# Patient Record
Sex: MALE | Race: WHITE | NOT HISPANIC OR LATINO | URBAN - METROPOLITAN AREA
[De-identification: names, ages, dates, MRNs, and addresses within clinical notes are randomized per-mention and may not be internally consistent; named-entity substitution may affect disease eponyms.]

---

## 2021-06-14 ENCOUNTER — EMERGENCY (EMERGENCY)
Facility: HOSPITAL | Age: 26
LOS: 1 days | Discharge: HOME | End: 2021-06-14
Attending: EMERGENCY MEDICINE
Payer: COMMERCIAL

## 2021-06-14 ENCOUNTER — TRANSCRIPTION ENCOUNTER (OUTPATIENT)
Age: 26
End: 2021-06-14

## 2021-06-14 VITALS — SYSTOLIC BLOOD PRESSURE: 183 MMHG | DIASTOLIC BLOOD PRESSURE: 89 MMHG | HEART RATE: 72 BPM

## 2021-06-14 VITALS
SYSTOLIC BLOOD PRESSURE: 149 MMHG | RESPIRATION RATE: 18 BRPM | WEIGHT: 220.02 LBS | DIASTOLIC BLOOD PRESSURE: 98 MMHG | HEIGHT: 73 IN | TEMPERATURE: 98 F | HEART RATE: 73 BPM | OXYGEN SATURATION: 99 %

## 2021-06-14 LAB
ANION GAP SERPL CALC-SCNC: 2 MMOL/L — LOW (ref 5–17)
APPEARANCE UR: CLEAR — SIGNIFICANT CHANGE UP
BACTERIA # UR AUTO: ABNORMAL /HPF
BASOPHILS # BLD AUTO: 0.04 K/UL — SIGNIFICANT CHANGE UP (ref 0–0.2)
BASOPHILS NFR BLD AUTO: 0.7 % — SIGNIFICANT CHANGE UP (ref 0–2)
BILIRUB UR-MCNC: NEGATIVE — SIGNIFICANT CHANGE UP
BUN SERPL-MCNC: 16 MG/DL — SIGNIFICANT CHANGE UP (ref 7–18)
CALCIUM SERPL-MCNC: 9.5 MG/DL — SIGNIFICANT CHANGE UP (ref 8.4–10.5)
CHLORIDE SERPL-SCNC: 108 MMOL/L — SIGNIFICANT CHANGE UP (ref 96–108)
CO2 SERPL-SCNC: 30 MMOL/L — SIGNIFICANT CHANGE UP (ref 22–31)
COLOR SPEC: YELLOW — SIGNIFICANT CHANGE UP
CREAT SERPL-MCNC: 1.33 MG/DL — HIGH (ref 0.5–1.3)
DIFF PNL FLD: NEGATIVE — SIGNIFICANT CHANGE UP
EOSINOPHIL # BLD AUTO: 0.32 K/UL — SIGNIFICANT CHANGE UP (ref 0–0.5)
EOSINOPHIL NFR BLD AUTO: 5.9 % — SIGNIFICANT CHANGE UP (ref 0–6)
EPI CELLS # UR: ABNORMAL /HPF
GLUCOSE SERPL-MCNC: 95 MG/DL — SIGNIFICANT CHANGE UP (ref 70–99)
GLUCOSE UR QL: NEGATIVE — SIGNIFICANT CHANGE UP
HCT VFR BLD CALC: 48.7 % — SIGNIFICANT CHANGE UP (ref 39–50)
HGB BLD-MCNC: 16.6 G/DL — SIGNIFICANT CHANGE UP (ref 13–17)
IMM GRANULOCYTES NFR BLD AUTO: 0.4 % — SIGNIFICANT CHANGE UP (ref 0–1.5)
KETONES UR-MCNC: NEGATIVE — SIGNIFICANT CHANGE UP
LEUKOCYTE ESTERASE UR-ACNC: NEGATIVE — SIGNIFICANT CHANGE UP
LYMPHOCYTES # BLD AUTO: 1.67 K/UL — SIGNIFICANT CHANGE UP (ref 1–3.3)
LYMPHOCYTES # BLD AUTO: 30.7 % — SIGNIFICANT CHANGE UP (ref 13–44)
MCHC RBC-ENTMCNC: 28.3 PG — SIGNIFICANT CHANGE UP (ref 27–34)
MCHC RBC-ENTMCNC: 34.1 GM/DL — SIGNIFICANT CHANGE UP (ref 32–36)
MCV RBC AUTO: 83 FL — SIGNIFICANT CHANGE UP (ref 80–100)
MONOCYTES # BLD AUTO: 0.45 K/UL — SIGNIFICANT CHANGE UP (ref 0–0.9)
MONOCYTES NFR BLD AUTO: 8.3 % — SIGNIFICANT CHANGE UP (ref 2–14)
NEUTROPHILS # BLD AUTO: 2.94 K/UL — SIGNIFICANT CHANGE UP (ref 1.8–7.4)
NEUTROPHILS NFR BLD AUTO: 54 % — SIGNIFICANT CHANGE UP (ref 43–77)
NITRITE UR-MCNC: NEGATIVE — SIGNIFICANT CHANGE UP
NRBC # BLD: 0 /100 WBCS — SIGNIFICANT CHANGE UP (ref 0–0)
PH UR: 5 — SIGNIFICANT CHANGE UP (ref 5–8)
PLATELET # BLD AUTO: 180 K/UL — SIGNIFICANT CHANGE UP (ref 150–400)
POTASSIUM SERPL-MCNC: 4.6 MMOL/L — SIGNIFICANT CHANGE UP (ref 3.5–5.3)
POTASSIUM SERPL-SCNC: 4.6 MMOL/L — SIGNIFICANT CHANGE UP (ref 3.5–5.3)
PROT UR-MCNC: 100
RBC # BLD: 5.87 M/UL — HIGH (ref 4.2–5.8)
RBC # FLD: 12.3 % — SIGNIFICANT CHANGE UP (ref 10.3–14.5)
SODIUM SERPL-SCNC: 140 MMOL/L — SIGNIFICANT CHANGE UP (ref 135–145)
SP GR SPEC: 1.01 — SIGNIFICANT CHANGE UP (ref 1.01–1.02)
URATE CRY FLD QL MICRO: ABNORMAL /HPF
UROBILINOGEN FLD QL: NEGATIVE — SIGNIFICANT CHANGE UP
WBC # BLD: 5.44 K/UL — SIGNIFICANT CHANGE UP (ref 3.8–10.5)
WBC # FLD AUTO: 5.44 K/UL — SIGNIFICANT CHANGE UP (ref 3.8–10.5)
WBC UR QL: SIGNIFICANT CHANGE UP /HPF (ref 0–5)

## 2021-06-14 PROCEDURE — 99284 EMERGENCY DEPT VISIT MOD MDM: CPT

## 2021-06-14 PROCEDURE — 36415 COLL VENOUS BLD VENIPUNCTURE: CPT

## 2021-06-14 PROCEDURE — 99284 EMERGENCY DEPT VISIT MOD MDM: CPT | Mod: 25

## 2021-06-14 PROCEDURE — 87591 N.GONORRHOEAE DNA AMP PROB: CPT

## 2021-06-14 PROCEDURE — 76870 US EXAM SCROTUM: CPT

## 2021-06-14 PROCEDURE — 80048 BASIC METABOLIC PNL TOTAL CA: CPT

## 2021-06-14 PROCEDURE — 76870 US EXAM SCROTUM: CPT | Mod: 26

## 2021-06-14 PROCEDURE — 85025 COMPLETE CBC W/AUTO DIFF WBC: CPT

## 2021-06-14 PROCEDURE — 87491 CHLMYD TRACH DNA AMP PROBE: CPT

## 2021-06-14 PROCEDURE — 81001 URINALYSIS AUTO W/SCOPE: CPT

## 2021-06-14 PROCEDURE — 96374 THER/PROPH/DIAG INJ IV PUSH: CPT

## 2021-06-14 RX ORDER — KETOROLAC TROMETHAMINE 30 MG/ML
15 SYRINGE (ML) INJECTION ONCE
Refills: 0 | Status: DISCONTINUED | OUTPATIENT
Start: 2021-06-14 | End: 2021-06-14

## 2021-06-14 RX ADMIN — Medication 15 MILLIGRAM(S): at 14:25

## 2021-06-14 NOTE — ED PROVIDER NOTE - PATIENT PORTAL LINK FT
You can access the FollowMyHealth Patient Portal offered by [facility] by registering at the following website: [email or web address]. By joining [facility]’s FollowMyHealth portal, you will also be able to view your health information using other applications (apps) compatible with our system.

## 2021-06-14 NOTE — CONSULT NOTE ADULT - ASSESSMENT
26 y/o Male w/ Torsed epididymal appendage left testicle    -Testicular US reviewed, no evidence of testicular torsion, pt w/ Torsed epididymal appendage  -Recommend rest, scrotal elevation, pain medication/ Naproxen 500mg BID PRN   -Outpatient follow up with Dr Alexander vs pt's outpatient Urologist   -D/w Dr Alexander and agrees

## 2021-06-14 NOTE — ED PROVIDER NOTE - CARE PROVIDER_API CALL
Burke Alexander  UROLOGY  [address] [place] [address]  Phone: [phone]  Fax: [phone]  Follow Up Time: Urgent

## 2021-06-14 NOTE — CONSULT NOTE ADULT - SUBJECTIVE AND OBJECTIVE BOX
Attending:  Dr Alexander     HPI:  24 y/o Male w/ PSHx of bilateral urethral implants, oversized bladder presented to ED c/o     PAST MEDICAL & SURGICAL HISTORY:      MEDICATIONS  (STANDING):    MEDICATIONS  (PRN):      Allergies    No Known Allergies    Intolerances        SOCIAL HISTORY:    FAMILY HISTORY:      Vital Signs Last 24 Hrs  T(C): 36.7 (2021 15:55), Max: 36.7 (2021 15:55)  T(F): 98.1 (2021 15:55), Max: 98.1 (2021 15:55)  HR: 63 (2021 15:55) (63 - 73)  BP: 142/90 (2021 15:55) (142/90 - 149/98)  BP(mean): --  RR: 18 (2021 15:55) (18 - 18)  SpO2: 99% (2021 15:55) (99% - 99%)    I&O's Summary      LABS:                        16.6   5.44  )-----------( 180      ( 2021 14:49 )             48.7     06-14    140  |  108  |  16  ----------------------------<  95  4.6   |  30  |  1.33<H>    Ca    9.5      2021 14:49        Urinalysis Basic - ( 2021 14:52 )    Color: Yellow / Appearance: Clear / S.015 / pH: x  Gluc: x / Ketone: Negative  / Bili: Negative / Urobili: Negative   Blood: x / Protein: 100 / Nitrite: Negative   Leuk Esterase: Negative / RBC: x / WBC 0-2 /HPF   Sq Epi: x / Non Sq Epi: Occasional /HPF / Bacteria: Trace /HPF        RADIOLOGY & ADDITIONAL STUDIES:  < from: US Testicles (21 @ 14:58) >  FINDINGS:    RIGHT:  Right testis: 4.3 cm x 4.9 cm x  2.2 cm. Normal echogenicity and echotexture with no masses or areas of architectural distortion. Normal arterial and venous blood flow pattern.  Right epididymis: 1.0 x 0.8 x 0.8 cm avascular hypoechoic focus within the right epididymis of uncertain etiology, however may represent a torsed epididymal appendage.  Right hydrocele: None.  Right varicocele: None.    LEFT:  Left testis: 3.9 cm x 4.6 cm x 2.3 cm. Normal echogenicity and echotexture with no masses or areas of architectural distortion. Normal arterial and venous blood flow pattern.  Left epididymis: Within normal limits.  Left hydrocele: None.  Left varicocele: None.    IMPRESSION:    No sonographic evidence of testicular torsion.    1.0 cm avascular hypoechoic focus within the right epididymis of uncertain etiology. Torsed epididymal appendage is a differential diagnostic consideration.    < end of copied text >   Attending:  Dr Alexander     HPI:  26 y/o Male w/ PSHx of bilateral urethral implants, oversized bladder presented to ED c/o left testicular pain, pain present since Wednesday, progressively worse, admits to increased testicular swelling, more prominent after playing basketball. Admits to hx of UTI, currently no burning on urination, no urethral discharge, no hx of STDs; admits to inability to fully empty urinary bladder and dribbling, has outpatient Urologist, last seen 2 yrs ago, work up done and as per pt all wnl.      PAST MEDICAL & SURGICAL HISTORY:  As above         Allergies  No Known Allergies  Intolerances        SOCIAL HISTORY:  Unknown   FAMILY HISTORY:  Unknown     Vital Signs Last 24 Hrs  T(C): 36.7 (2021 15:55), Max: 36.7 (2021 15:55)  T(F): 98.1 (2021 15:55), Max: 98.1 (2021 15:55)  HR: 63 (2021 15:55) (63 - 73)  BP: 142/90 (2021 15:55) (142/90 - 149/98)  BP(mean): --  RR: 18 (2021 15:55) (18 - 18)  SpO2: 99% (2021 15:55) (99% - 99%)    I&O's Summary      LABS:                        16.6   5.44  )-----------( 180      ( 2021 14:49 )             48.7     06-14    140  |  108  |  16  ----------------------------<  95  4.6   |  30  |  1.33<H>    Ca    9.5      2021 14:49        Urinalysis Basic - ( 2021 14:52 )    Color: Yellow / Appearance: Clear / S.015 / pH: x  Gluc: x / Ketone: Negative  / Bili: Negative / Urobili: Negative   Blood: x / Protein: 100 / Nitrite: Negative   Leuk Esterase: Negative / RBC: x / WBC 0-2 /HPF   Sq Epi: x / Non Sq Epi: Occasional /HPF / Bacteria: Trace /HPF        RADIOLOGY & ADDITIONAL STUDIES:  < from: US Testicles (21 @ 14:58) >  FINDINGS:    RIGHT:  Right testis: 4.3 cm x 4.9 cm x  2.2 cm. Normal echogenicity and echotexture with no masses or areas of architectural distortion. Normal arterial and venous blood flow pattern.  Right epididymis: 1.0 x 0.8 x 0.8 cm avascular hypoechoic focus within the right epididymis of uncertain etiology, however may represent a torsed epididymal appendage.  Right hydrocele: None.  Right varicocele: None.    LEFT:  Left testis: 3.9 cm x 4.6 cm x 2.3 cm. Normal echogenicity and echotexture with no masses or areas of architectural distortion. Normal arterial and venous blood flow pattern.  Left epididymis: Within normal limits.  Left hydrocele: None.  Left varicocele: None.    IMPRESSION:    No sonographic evidence of testicular torsion.    1.0 cm avascular hypoechoic focus within the right epididymis of uncertain etiology. Torsed epididymal appendage is a differential diagnostic consideration.    < end of copied text >

## 2021-06-14 NOTE — ED PROVIDER NOTE - CLINICAL SUMMARY MEDICAL DECISION MAKING FREE TEXT BOX
US and labs noted, pt seen by urology and recommended discharged with Naproxen and outpatient urology follow up. Precautions reviewed.

## 2021-06-14 NOTE — ED PROVIDER NOTE - OBJECTIVE STATEMENT
25 year old male came to the ED because of right testicular pain. 25 year old male came to the ED because of right testicular pain. he palpated a mass on the right testicle one week ago and yesterday it became very painful. He went to urgent care and was sent to the ED. No fever, no chills, no vomiting, no urinary complaints, no back pain, no constipation, still passing gas.

## 2021-06-15 LAB
C TRACH RRNA SPEC QL NAA+PROBE: SIGNIFICANT CHANGE UP
N GONORRHOEA RRNA SPEC QL NAA+PROBE: SIGNIFICANT CHANGE UP

## 2022-03-08 NOTE — ED PROVIDER NOTE - LOCATION
Verbal and written discharge instructions given, scripts provided with education. Patient verbalized understanding and denies further questions or concerns.      Blank Corral RN  03/08/22 [id number]
right testicle/genitalia